# Patient Record
Sex: MALE | Race: WHITE | NOT HISPANIC OR LATINO | URBAN - METROPOLITAN AREA
[De-identification: names, ages, dates, MRNs, and addresses within clinical notes are randomized per-mention and may not be internally consistent; named-entity substitution may affect disease eponyms.]

---

## 2019-01-30 ENCOUNTER — OFFICE (OUTPATIENT)
Dept: URBAN - METROPOLITAN AREA CLINIC 106 | Facility: CLINIC | Age: 69
End: 2019-01-30

## 2019-01-30 VITALS
HEART RATE: 75 BPM | DIASTOLIC BLOOD PRESSURE: 81 MMHG | HEIGHT: 66 IN | SYSTOLIC BLOOD PRESSURE: 125 MMHG | RESPIRATION RATE: 18 BRPM | WEIGHT: 146 LBS

## 2019-01-30 DIAGNOSIS — K63.5: ICD-10-CM

## 2019-01-30 DIAGNOSIS — R10.9 ABDOMINAL PAIN: ICD-10-CM

## 2019-01-30 DIAGNOSIS — R11.0 NAUSEA: ICD-10-CM

## 2019-01-30 DIAGNOSIS — R11.10 VOMITING: ICD-10-CM

## 2019-01-30 DIAGNOSIS — R14.2 EXCESSIVE BELCHING: ICD-10-CM

## 2019-01-30 DIAGNOSIS — K21.9 GASTROESOPHAGEAL REFLUX DISEASE: ICD-10-CM

## 2019-01-30 PROCEDURE — 99213 OFFICE O/P EST LOW 20 MIN: CPT | Performed by: INTERNAL MEDICINE

## 2019-01-30 NOTE — SERVICEHPINOTES
Today the patient came in for follow up after colonoscopy, since the colonoscopy patient has been experiencing abdominal pain, gerd, belching, nausea, and vomiting. Patient's last History and Physical reviewed and no change.   Patient denies fever, dysphagia,  change in bowel habits, constipation, diarrhea, rectal bleeding, melena, and significant change in weight. Denies shortness of breath and chest pain.

## 2019-07-03 ENCOUNTER — OFFICE (OUTPATIENT)
Dept: URBAN - METROPOLITAN AREA CLINIC 106 | Facility: CLINIC | Age: 69
End: 2019-07-03

## 2019-07-03 VITALS
SYSTOLIC BLOOD PRESSURE: 118 MMHG | HEIGHT: 66 IN | HEART RATE: 76 BPM | WEIGHT: 181 LBS | RESPIRATION RATE: 18 BRPM | DIASTOLIC BLOOD PRESSURE: 70 MMHG

## 2019-07-03 DIAGNOSIS — R14.2 BELCHING SYMPTOM: ICD-10-CM

## 2019-07-03 DIAGNOSIS — R11.10 VOMITING: ICD-10-CM

## 2019-07-03 DIAGNOSIS — K63.5: ICD-10-CM

## 2019-07-03 DIAGNOSIS — K29.70 GASTRITIS, NEG FOR H PYLORI: ICD-10-CM

## 2019-07-03 DIAGNOSIS — R14.2 EXCESSIVE BELCHING: ICD-10-CM

## 2019-07-03 DIAGNOSIS — R11.0 NAUSEA: ICD-10-CM

## 2019-07-03 DIAGNOSIS — K21.9 GASTROESOPHAGEAL REFLUX DISEASE: ICD-10-CM

## 2019-07-03 DIAGNOSIS — R10.9 ABDOMINAL PAIN: ICD-10-CM

## 2019-07-03 PROCEDURE — 99213 OFFICE O/P EST LOW 20 MIN: CPT | Performed by: INTERNAL MEDICINE

## 2019-07-03 RX ORDER — PANTOPRAZOLE SODIUM 40 MG/1
40 TABLET, DELAYED RELEASE ORAL
Qty: 30 | Status: ACTIVE
Start: 2019-07-03

## 2019-07-03 NOTE — SERVICEHPINOTES
Follow-up of GERD was discussed.   The patient has typically complained of   heartburn  .      Treatment has consisted of   Protonix  taken at   once daily  .   This therapy has been associated with   minimal   relief.   The patient   has   been having breakthru GERD symptoms.  Symptoms do   awakens the patient from sleep  .    Has been treating residual symptoms with   Protonix  .   Continuing symptoms may be brought on by   eating meals late at night and excess caffeine intake  .

## 2019-09-22 ENCOUNTER — WALK IN (OUTPATIENT)
Dept: URGENT CARE | Age: 69
End: 2019-09-22

## 2019-09-22 VITALS
SYSTOLIC BLOOD PRESSURE: 124 MMHG | RESPIRATION RATE: 16 BRPM | TEMPERATURE: 98.6 F | WEIGHT: 185.63 LBS | HEART RATE: 71 BPM | OXYGEN SATURATION: 95 % | DIASTOLIC BLOOD PRESSURE: 63 MMHG

## 2019-09-22 DIAGNOSIS — J00 RHINOPHARYNGITIS: Primary | ICD-10-CM

## 2019-09-22 PROCEDURE — 99203 OFFICE O/P NEW LOW 30 MIN: CPT | Performed by: EMERGENCY MEDICINE

## 2019-09-22 RX ORDER — AZITHROMYCIN 250 MG/1
250 TABLET, FILM COATED ORAL DAILY
Qty: 6 TABLET | Refills: 0 | Status: SHIPPED | OUTPATIENT
Start: 2019-09-22

## 2020-02-13 ENCOUNTER — OFFICE (OUTPATIENT)
Dept: URBAN - METROPOLITAN AREA CLINIC 106 | Facility: CLINIC | Age: 70
End: 2020-02-13

## 2020-02-13 VITALS
DIASTOLIC BLOOD PRESSURE: 67 MMHG | HEART RATE: 72 BPM | SYSTOLIC BLOOD PRESSURE: 129 MMHG | WEIGHT: 184 LBS | RESPIRATION RATE: 18 BRPM | HEIGHT: 66 IN

## 2020-02-13 DIAGNOSIS — R14.2 BELCHING SYMPTOM: ICD-10-CM

## 2020-02-13 DIAGNOSIS — K63.5: ICD-10-CM

## 2020-02-13 DIAGNOSIS — K29.70 GASTRITIS, NEG FOR H PYLORI: ICD-10-CM

## 2020-02-13 DIAGNOSIS — K21.9 GASTROESOPHAGEAL REFLUX DISEASE: ICD-10-CM

## 2020-02-13 PROCEDURE — 99215 OFFICE O/P EST HI 40 MIN: CPT | Performed by: INTERNAL MEDICINE

## 2020-02-13 NOTE — SERVICEHPINOTES
Follow-up of GERD was discussed.   The patient has typically complained of   heartburn  .      Treatment has consisted of   Protonix  taken at   once daily  .   This therapy has been associated with   excellent   relief.   The patient   has not   been having breakthru GERD symptoms.  Symptoms do   not awaken the patient from sleep  .    Has been treating residual symptoms with   Protonix  .   Continuing symptoms may be brought on by   chocolate  .

## 2020-03-19 ENCOUNTER — OFFICE (OUTPATIENT)
Dept: URBAN - METROPOLITAN AREA CLINIC 106 | Facility: CLINIC | Age: 70
End: 2020-03-19

## 2020-03-19 VITALS — HEIGHT: 66 IN | WEIGHT: 180 LBS

## 2020-03-19 DIAGNOSIS — K64.8 INTERNAL HEMORRHOIDS: ICD-10-CM

## 2020-03-19 DIAGNOSIS — K63.5: ICD-10-CM

## 2020-03-19 DIAGNOSIS — Z86.010 PERSONAL HISTORY COLON POLYPS: ICD-10-CM

## 2020-03-19 DIAGNOSIS — R14.2 BELCHING SYMPTOM: ICD-10-CM

## 2020-03-19 DIAGNOSIS — K21.9 GASTROESOPHAGEAL REFLUX DISEASE: ICD-10-CM

## 2020-03-19 DIAGNOSIS — K29.70 GASTRITIS: ICD-10-CM

## 2020-03-19 DIAGNOSIS — M81.0: ICD-10-CM

## 2020-03-19 PROCEDURE — 99213 OFFICE O/P EST LOW 20 MIN: CPT | Performed by: INTERNAL MEDICINE

## 2020-03-19 RX ORDER — PANTOPRAZOLE SODIUM 40 MG/1
40 TABLET, DELAYED RELEASE ORAL
Qty: 30 | Status: ACTIVE
Start: 2019-07-03

## 2020-03-19 NOTE — SERVICEHPINOTES
Patient came for follow up after EGD and colonoscopy, had some gas and bloating after the test but is feeling well at this time. Denies any abdominal pain, nausea, vomiting, diarrhea or rectal bleeding.

## 2020-10-21 ENCOUNTER — OFFICE (OUTPATIENT)
Dept: URBAN - METROPOLITAN AREA CLINIC 106 | Facility: CLINIC | Age: 70
End: 2020-10-21

## 2020-10-21 VITALS — TEMPERATURE: 97.1 F | WEIGHT: 191.2 LBS | HEIGHT: 66 IN

## 2020-10-21 DIAGNOSIS — K44.9 HIATAL HERNIA: ICD-10-CM

## 2020-10-21 DIAGNOSIS — K63.5: ICD-10-CM

## 2020-10-21 DIAGNOSIS — Z86.010 PERSONAL HISTORY COLON POLYPS: ICD-10-CM

## 2020-10-21 DIAGNOSIS — K21.9 GASTROESOPHAGEAL REFLUX DISEASE: ICD-10-CM

## 2020-10-21 DIAGNOSIS — K29.70 GASTRITIS: ICD-10-CM

## 2020-10-21 DIAGNOSIS — K64.8 INTERNAL HEMORRHOIDS: ICD-10-CM

## 2020-10-21 DIAGNOSIS — M81.0: ICD-10-CM

## 2020-10-21 DIAGNOSIS — R14.2 BELCHING SYMPTOM: ICD-10-CM

## 2020-10-21 PROCEDURE — 99215 OFFICE O/P EST HI 40 MIN: CPT | Performed by: INTERNAL MEDICINE

## 2021-02-08 ENCOUNTER — OFFICE (OUTPATIENT)
Dept: URBAN - METROPOLITAN AREA CLINIC 106 | Facility: CLINIC | Age: 71
End: 2021-02-08

## 2021-02-08 VITALS — HEIGHT: 66 IN

## 2021-02-08 DIAGNOSIS — K64.8 INTERNAL HEMORRHOIDS: ICD-10-CM

## 2021-02-08 DIAGNOSIS — Z86.010 PERSONAL HISTORY COLON POLYPS: ICD-10-CM

## 2021-02-08 DIAGNOSIS — K21.9 GASTROESOPHAGEAL REFLUX DISEASE: ICD-10-CM

## 2021-02-08 DIAGNOSIS — R14.2 BELCHING SYMPTOM: ICD-10-CM

## 2021-02-08 DIAGNOSIS — K63.5: ICD-10-CM

## 2021-02-08 DIAGNOSIS — K29.70 GASTRITIS: ICD-10-CM

## 2021-02-08 DIAGNOSIS — M81.0: ICD-10-CM

## 2021-02-08 DIAGNOSIS — K44.9 HIATAL HERNIA: ICD-10-CM

## 2021-02-08 PROCEDURE — G0406 INPT/TELE FOLLOW UP 15: HCPCS | Performed by: INTERNAL MEDICINE

## 2021-02-08 PROCEDURE — 99213 OFFICE O/P EST LOW 20 MIN: CPT | Performed by: INTERNAL MEDICINE

## 2021-02-08 RX ORDER — PANTOPRAZOLE SODIUM 40 MG/1
TABLET, DELAYED RELEASE ORAL
Qty: 30 | Status: ACTIVE
Start: 2021-02-08

## 2021-02-08 NOTE — SERVICEHPINOTES
The patient is scheduled today for a telehealth visit, due to current mandate for social distancing on account of the COVID-19 Pandemic. The clinician is connected to a secure network. The patient consents to this teleconference being a billable service.   This visit used [facility] for a live, interactive audio and video telehealth visit.   Appointment lasted 20 minutes. Follow-up of GERD was discussed.   The patient has typically complained of   heartburn  .      Treatment has consisted of   OTC antacids  taken at   once daily  .   This therapy has been associated with   complete   relief.   The patient   has not   been having breakthru GERD symptoms.  Symptoms do   not awaken the patient from sleep  .    Has been treating residual symptoms with   Pantoprazole  .   Continuing symptoms may be brought on by   .

## 2025-08-29 ENCOUNTER — AMBULATORY SURGICAL CENTER (OUTPATIENT)
Dept: URBAN - METROPOLITAN AREA SURGERY 68 | Facility: SURGERY | Age: 75
End: 2025-08-29

## 2025-08-29 VITALS — HEIGHT: 66 IN

## 2025-08-29 DIAGNOSIS — K63.5 POLYP OF COLON: ICD-10-CM

## 2025-08-29 DIAGNOSIS — K57.30 DIVERTICULOSIS OF LARGE INTESTINE WITHOUT PERFORATION OR ABS: ICD-10-CM

## 2025-08-29 PROBLEM — Z12.11 SCREENING FOR COLON CANCER: Status: ACTIVE | Noted: 2025-08-29

## 2025-08-29 PROCEDURE — 45385 COLONOSCOPY W/LESION REMOVAL: CPT | Performed by: INTERNAL MEDICINE
